# Patient Record
Sex: FEMALE | URBAN - NONMETROPOLITAN AREA
[De-identification: names, ages, dates, MRNs, and addresses within clinical notes are randomized per-mention and may not be internally consistent; named-entity substitution may affect disease eponyms.]

---

## 2024-05-10 ENCOUNTER — CLAIMS CREATED FROM THE CLAIM WINDOW (OUTPATIENT)
Dept: URBAN - NONMETROPOLITAN AREA CLINIC 13 | Facility: CLINIC | Age: 34
End: 2024-05-10

## 2024-05-10 ENCOUNTER — OFFICE VISIT (OUTPATIENT)
Dept: URBAN - NONMETROPOLITAN AREA CLINIC 13 | Facility: CLINIC | Age: 34
End: 2024-05-10
Payer: COMMERCIAL

## 2024-05-10 ENCOUNTER — DASHBOARD ENCOUNTERS (OUTPATIENT)
Age: 34
End: 2024-05-10

## 2024-05-10 VITALS
DIASTOLIC BLOOD PRESSURE: 89 MMHG | BODY MASS INDEX: 23.86 KG/M2 | HEART RATE: 96 BPM | SYSTOLIC BLOOD PRESSURE: 126 MMHG | HEIGHT: 67 IN | WEIGHT: 152 LBS

## 2024-05-10 DIAGNOSIS — R10.30 LOWER ABDOMINAL PAIN: ICD-10-CM

## 2024-05-10 DIAGNOSIS — R19.7 DIARRHEA, UNSPECIFIED TYPE: ICD-10-CM

## 2024-05-10 PROCEDURE — 99204 OFFICE O/P NEW MOD 45 MIN: CPT

## 2024-05-10 PROCEDURE — 99244 OFF/OP CNSLTJ NEW/EST MOD 40: CPT

## 2024-05-10 RX ORDER — DICYCLOMINE HYDROCHLORIDE 10 MG/1
2 CAPSULES CAPSULE ORAL THREE TIMES A DAY
Qty: 180 | Refills: 1 | OUTPATIENT
Start: 2024-05-10 | End: 2024-07-09

## 2024-05-21 ENCOUNTER — TELEPHONE ENCOUNTER (OUTPATIENT)
Dept: URBAN - NONMETROPOLITAN AREA CLINIC 13 | Facility: CLINIC | Age: 34
End: 2024-05-21

## 2024-05-29 ENCOUNTER — TELEPHONE ENCOUNTER (OUTPATIENT)
Dept: URBAN - NONMETROPOLITAN AREA CLINIC 2 | Facility: CLINIC | Age: 34
End: 2024-05-29

## 2024-05-29 PROBLEM — 62315008: Status: ACTIVE | Noted: 2024-05-10

## 2024-05-29 PROBLEM — 54586004: Status: ACTIVE | Noted: 2024-05-10

## 2024-06-21 ENCOUNTER — OFFICE VISIT (OUTPATIENT)
Dept: URBAN - NONMETROPOLITAN AREA CLINIC 13 | Facility: CLINIC | Age: 34
End: 2024-06-21
Payer: COMMERCIAL

## 2024-06-21 ENCOUNTER — LAB OUTSIDE AN ENCOUNTER (OUTPATIENT)
Dept: URBAN - NONMETROPOLITAN AREA CLINIC 13 | Facility: CLINIC | Age: 34
End: 2024-06-21

## 2024-06-21 VITALS
BODY MASS INDEX: 23.64 KG/M2 | HEART RATE: 73 BPM | DIASTOLIC BLOOD PRESSURE: 85 MMHG | SYSTOLIC BLOOD PRESSURE: 124 MMHG | WEIGHT: 150.6 LBS | HEIGHT: 67 IN

## 2024-06-21 DIAGNOSIS — R19.7 DIARRHEA, UNSPECIFIED TYPE: ICD-10-CM

## 2024-06-21 DIAGNOSIS — Z83.79 FAMILY HISTORY OF CROHN'S DISEASE: ICD-10-CM

## 2024-06-21 DIAGNOSIS — R10.13 ABDOMINAL DISCOMFORT, EPIGASTRIC: ICD-10-CM

## 2024-06-21 PROBLEM — 160386006: Status: ACTIVE | Noted: 2024-06-21

## 2024-06-21 PROCEDURE — 99213 OFFICE O/P EST LOW 20 MIN: CPT

## 2024-06-21 RX ORDER — DICYCLOMINE HYDROCHLORIDE 10 MG/1
2 CAPSULES CAPSULE ORAL THREE TIMES A DAY
Qty: 180 | Refills: 1 | Status: ACTIVE | COMMUNITY
Start: 2024-05-10 | End: 2024-07-09

## 2024-06-21 RX ORDER — DICYCLOMINE HYDROCHLORIDE 10 MG/1
2 CAPSULES CAPSULE ORAL THREE TIMES A DAY
Qty: 180 | Refills: 1 | OUTPATIENT

## 2024-06-21 RX ORDER — SODIUM PICOSULFATE, MAGNESIUM OXIDE, AND ANHYDROUS CITRIC ACID 12; 3.5; 1 G/175ML; G/175ML; MG/175ML
175 ML THE FIRST DOSE THE EVENING BEFORE AND SECOND DOSE THE MORNING OF COLONOSCOPY LIQUID ORAL ONCE A DAY
Qty: 350 | Refills: 0 | OUTPATIENT
Start: 2024-06-21 | End: 2024-06-23

## 2024-06-21 NOTE — HPI-TODAY'S VISIT:
5/10/2024 Westley presents for evaluation of diarrhea referred by Dr. Luz Brownlee. A copy of this note and recommendations will be forwarded to her office. Patient states looser stool with frequency that has continued for  3-4 months.  Just this Monday she had 10 or more BMs.  Prior to that she was pretty regular and typically normal type stools with only an occassional loose episode. A virus did run through her family recently twice including GI symptoms with her family. However she continues.  She has a sister with Crohn's recent diagnosis. She denies blood in stool but does note occassions of  lower abdominal cramping pain. She does endorse rare use of NSAIDs with events of migraines. She denies any history of new medications or changes. She is not taking any OTC supplements. She deniesa ny previous medical history. 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  SP   6/21/2024 Mando returns to clinic for follow-up.  She did not find any medications useful and continues with loose to watery bowel movements 4-10 times a day.  She may go some days without a bowel movement.  She denies abdominal cramping pain or blood per rectum.  This is not affected by dietary intake.  She avoids NSAIDs.  She continues fiber and Florastor as well as dicyclomine.  There have been days where she has taken dicyclomine only as needed.  Her labs resulted negative for infection.  Today she is agreeable to scheduling a colonoscopy with biopsies to rule out microscopic colitis.  This can be canceled if she has significant improvement. SP

## 2024-09-26 ENCOUNTER — WEB ENCOUNTER (OUTPATIENT)
Dept: URBAN - NONMETROPOLITAN AREA CLINIC 2 | Facility: CLINIC | Age: 34
End: 2024-09-26

## 2024-09-27 ENCOUNTER — LAB OUTSIDE AN ENCOUNTER (OUTPATIENT)
Dept: URBAN - NONMETROPOLITAN AREA CLINIC 2 | Facility: CLINIC | Age: 34
End: 2024-09-27

## 2024-09-27 ENCOUNTER — OFFICE VISIT (OUTPATIENT)
Dept: URBAN - METROPOLITAN AREA MEDICAL CENTER 1 | Facility: MEDICAL CENTER | Age: 34
End: 2024-09-27

## 2024-09-27 RX ORDER — DICYCLOMINE HYDROCHLORIDE 10 MG/1
2 CAPSULES CAPSULE ORAL THREE TIMES A DAY
Qty: 180 | Refills: 1 | Status: ACTIVE | COMMUNITY

## 2024-09-30 LAB
AP CASE REPORT: (no result)
AP FINAL DIAGNOSIS: (no result)
AP GROSS DESCRIPTION: (no result)
AP MICROSCOPIC DESCRIPTION: (no result)

## 2024-10-18 ENCOUNTER — OFFICE VISIT (OUTPATIENT)
Dept: URBAN - NONMETROPOLITAN AREA CLINIC 13 | Facility: CLINIC | Age: 34
End: 2024-10-18

## 2024-10-18 VITALS
HEIGHT: 67 IN | SYSTOLIC BLOOD PRESSURE: 134 MMHG | BODY MASS INDEX: 24.14 KG/M2 | DIASTOLIC BLOOD PRESSURE: 83 MMHG | WEIGHT: 153.8 LBS | HEART RATE: 78 BPM

## 2024-10-18 RX ORDER — TRAMADOL HYDROCHLORIDE 50 MG/1
TAKE ONE TABLET BY MOUTH EVERY 6 HOURS TABLET, COATED ORAL
Qty: 30 UNSPECIFIED | Refills: 0 | Status: ACTIVE | COMMUNITY

## 2024-10-18 RX ORDER — DICYCLOMINE HYDROCHLORIDE 10 MG/1
2 CAPSULES CAPSULE ORAL THREE TIMES A DAY
Qty: 180 | Refills: 1 | OUTPATIENT

## 2024-10-18 RX ORDER — RIFAXIMIN 550 MG/1
1 TABLET TABLET ORAL THREE TIMES A DAY
Qty: 42 TABLET | Refills: 3 | OUTPATIENT
Start: 2024-10-18 | End: 2024-12-12

## 2024-10-18 NOTE — HPI-TODAY'S VISIT:
5/10/2024 Westley presents for evaluation of diarrhea referred by Dr. Luz Brownlee. A copy of this note and recommendations will be forwarded to her office. Patient states looser stool with frequency that has continued for  3-4 months.  Just this Monday she had 10 or more BMs.  Prior to that she was pretty regular and typically normal type stools with only an occassional loose episode. A virus did run through her family recently twice including GI symptoms with her family. However she continues.  She has a sister with Crohn's recent diagnosis. She denies blood in stool but does note occassions of  lower abdominal cramping pain. She does endorse rare use of NSAIDs with events of migraines. She denies any history of new medications or changes. She is not taking any OTC supplements. She deniesa ny previous medical history. 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  SP   6/21/2024 Mando returns to clinic for follow-up.  She did not find any medications useful and continues with loose to watery bowel movements 4-10 times a day.  She may go some days without a bowel movement.  She denies abdominal cramping pain or blood per rectum.  This is not affected by dietary intake.  She avoids NSAIDs.  She continues fiber and Florastor as well as dicyclomine.  There have been days where she has taken dicyclomine only as needed.  Her labs resulted negative for infection.  Today she is agreeable to scheduling a colonoscopy with biopsies to rule out microscopic colitis.  This can be canceled if she has significant improvement. SP   Avoids lactose 10/18/2024 Ms. Grant returns to clinic following her colonoscopy with biopsies negative for microscopic colitis.  She did have 1 small polyp removed which was adenomatous on path.  She is due to repeat her colonoscopy in 5 years.  Today she continues with episodes of loose stool and abdominal tenderness usually worse in the left upper quadrant.  This waxes and wanes.  She has multiple days where she is doing well without diarrhea and then she will have a few days where she has breakthrough.  She has not identified any dietary triggers.  She has been lactose-free for a long time.  She is taking dicyclomine as needed.  She is continuing fiber and Florastor. We discussed starting a 2-week course of Xifaxan and trial low FODMAP elimination diet.  She is otherwise doing well and has no new GI complaints today. SP

## 2024-11-07 PROBLEM — 428283002: Status: ACTIVE | Noted: 2024-11-07

## 2025-02-21 ENCOUNTER — OFFICE VISIT (OUTPATIENT)
Dept: URBAN - NONMETROPOLITAN AREA CLINIC 13 | Facility: CLINIC | Age: 35
End: 2025-02-21